# Patient Record
Sex: FEMALE | Race: WHITE | NOT HISPANIC OR LATINO
[De-identification: names, ages, dates, MRNs, and addresses within clinical notes are randomized per-mention and may not be internally consistent; named-entity substitution may affect disease eponyms.]

---

## 2017-02-24 ENCOUNTER — RECORDS - HEALTHEAST (OUTPATIENT)
Dept: ADMINISTRATIVE | Facility: OTHER | Age: 72
End: 2017-02-24

## 2017-02-24 ENCOUNTER — OFFICE VISIT - HEALTHEAST (OUTPATIENT)
Dept: FAMILY MEDICINE | Facility: CLINIC | Age: 72
End: 2017-02-24

## 2017-02-24 DIAGNOSIS — F33.1 MODERATE EPISODE OF RECURRENT MAJOR DEPRESSIVE DISORDER (H): ICD-10-CM

## 2017-02-24 DIAGNOSIS — R41.3 MEMORY LOSS: ICD-10-CM

## 2017-02-24 DIAGNOSIS — R32 URINARY INCONTINENCE: ICD-10-CM

## 2017-02-24 DIAGNOSIS — K59.00 CONSTIPATION: ICD-10-CM

## 2017-02-24 DIAGNOSIS — R32 URINARY INCONTINENCE, UNSPECIFIED TYPE: ICD-10-CM

## 2017-02-24 DIAGNOSIS — F03.90 DEMENTIA WITHOUT BEHAVIORAL DISTURBANCE, UNSPECIFIED DEMENTIA TYPE: ICD-10-CM

## 2017-02-24 DIAGNOSIS — Z00.01 ENCOUNTER FOR GENERAL ADULT MEDICAL EXAMINATION WITH ABNORMAL FINDINGS: ICD-10-CM

## 2017-02-24 DIAGNOSIS — N39.0 RECURRENT UTI: ICD-10-CM

## 2017-02-24 DIAGNOSIS — E03.9 HYPOTHYROIDISM, UNSPECIFIED TYPE: ICD-10-CM

## 2017-02-24 ASSESSMENT — MIFFLIN-ST. JEOR: SCORE: 857.63

## 2017-02-25 ENCOUNTER — COMMUNICATION - HEALTHEAST (OUTPATIENT)
Dept: FAMILY MEDICINE | Facility: CLINIC | Age: 72
End: 2017-02-25

## 2017-06-03 ENCOUNTER — COMMUNICATION - HEALTHEAST (OUTPATIENT)
Dept: SCHEDULING | Facility: CLINIC | Age: 72
End: 2017-06-03

## 2017-06-05 ENCOUNTER — COMMUNICATION - HEALTHEAST (OUTPATIENT)
Dept: FAMILY MEDICINE | Facility: CLINIC | Age: 72
End: 2017-06-05

## 2017-06-13 ENCOUNTER — COMMUNICATION - HEALTHEAST (OUTPATIENT)
Dept: FAMILY MEDICINE | Facility: CLINIC | Age: 72
End: 2017-06-13

## 2017-06-13 DIAGNOSIS — N39.0 UTI (URINARY TRACT INFECTION): ICD-10-CM

## 2017-07-19 ENCOUNTER — COMMUNICATION - HEALTHEAST (OUTPATIENT)
Dept: FAMILY MEDICINE | Facility: CLINIC | Age: 72
End: 2017-07-19

## 2017-07-19 DIAGNOSIS — K59.00 CONSTIPATION: ICD-10-CM

## 2017-07-20 ENCOUNTER — OFFICE VISIT - HEALTHEAST (OUTPATIENT)
Dept: FAMILY MEDICINE | Facility: CLINIC | Age: 72
End: 2017-07-20

## 2017-07-20 ASSESSMENT — MIFFLIN-ST. JEOR: SCORE: 847.19

## 2017-07-27 ENCOUNTER — RECORDS - HEALTHEAST (OUTPATIENT)
Dept: ADMINISTRATIVE | Facility: OTHER | Age: 72
End: 2017-07-27

## 2017-09-15 ENCOUNTER — RECORDS - HEALTHEAST (OUTPATIENT)
Dept: ADMINISTRATIVE | Facility: OTHER | Age: 72
End: 2017-09-15

## 2017-09-20 ENCOUNTER — RECORDS - HEALTHEAST (OUTPATIENT)
Dept: ADMINISTRATIVE | Facility: OTHER | Age: 72
End: 2017-09-20

## 2017-10-18 ENCOUNTER — COMMUNICATION - HEALTHEAST (OUTPATIENT)
Dept: FAMILY MEDICINE | Facility: CLINIC | Age: 72
End: 2017-10-18

## 2017-10-31 ENCOUNTER — COMMUNICATION - HEALTHEAST (OUTPATIENT)
Dept: FAMILY MEDICINE | Facility: CLINIC | Age: 72
End: 2017-10-31

## 2017-11-10 ENCOUNTER — COMMUNICATION - HEALTHEAST (OUTPATIENT)
Dept: FAMILY MEDICINE | Facility: CLINIC | Age: 72
End: 2017-11-10

## 2018-01-15 ENCOUNTER — COMMUNICATION - HEALTHEAST (OUTPATIENT)
Dept: FAMILY MEDICINE | Facility: CLINIC | Age: 73
End: 2018-01-15

## 2018-02-07 ENCOUNTER — COMMUNICATION - HEALTHEAST (OUTPATIENT)
Dept: SCHEDULING | Facility: CLINIC | Age: 73
End: 2018-02-07

## 2018-02-08 ENCOUNTER — RECORDS - HEALTHEAST (OUTPATIENT)
Dept: ADMINISTRATIVE | Facility: OTHER | Age: 73
End: 2018-02-08

## 2018-02-24 ENCOUNTER — COMMUNICATION - HEALTHEAST (OUTPATIENT)
Dept: FAMILY MEDICINE | Facility: CLINIC | Age: 73
End: 2018-02-24

## 2018-04-20 ENCOUNTER — COMMUNICATION - HEALTHEAST (OUTPATIENT)
Dept: FAMILY MEDICINE | Facility: CLINIC | Age: 73
End: 2018-04-20

## 2018-04-20 DIAGNOSIS — I63.9 CEREBRAL INFARCTION (H): ICD-10-CM

## 2018-04-20 DIAGNOSIS — I10 HTN (HYPERTENSION): ICD-10-CM

## 2018-04-20 DIAGNOSIS — Z29.89 NEED FOR PROPHYLAXIS AGAINST URINARY TRACT INFECTION: ICD-10-CM

## 2018-04-20 DIAGNOSIS — F33.1 MODERATE EPISODE OF RECURRENT MAJOR DEPRESSIVE DISORDER (H): ICD-10-CM

## 2018-04-21 RX ORDER — SERTRALINE HYDROCHLORIDE 100 MG/1
100 TABLET, FILM COATED ORAL DAILY
Qty: 28 TABLET | Refills: 10 | Status: SHIPPED | OUTPATIENT
Start: 2018-04-21

## 2018-04-21 RX ORDER — CLOPIDOGREL BISULFATE 75 MG/1
75 TABLET ORAL DAILY
Qty: 28 TABLET | Refills: 10 | Status: SHIPPED | OUTPATIENT
Start: 2018-04-21

## 2018-04-21 RX ORDER — TRIMETHOPRIM 100 MG/1
100 TABLET ORAL DAILY
Qty: 28 TABLET | Refills: 10 | Status: SHIPPED | OUTPATIENT
Start: 2018-04-21

## 2018-05-03 ENCOUNTER — COMMUNICATION - HEALTHEAST (OUTPATIENT)
Dept: FAMILY MEDICINE | Facility: CLINIC | Age: 73
End: 2018-05-03

## 2018-05-03 DIAGNOSIS — E03.9 HYPOTHYROIDISM: ICD-10-CM

## 2018-05-03 RX ORDER — LEVOTHYROXINE SODIUM 88 UG/1
TABLET ORAL
Qty: 28 TABLET | Refills: 11 | Status: SHIPPED | OUTPATIENT
Start: 2018-05-03

## 2018-05-18 ENCOUNTER — COMMUNICATION - HEALTHEAST (OUTPATIENT)
Dept: FAMILY MEDICINE | Facility: CLINIC | Age: 73
End: 2018-05-18

## 2018-05-18 DIAGNOSIS — R39.89 SENSATION OF PRESSURE IN BLADDER AREA: ICD-10-CM

## 2018-05-19 ENCOUNTER — RECORDS - HEALTHEAST (OUTPATIENT)
Dept: LAB | Facility: CLINIC | Age: 73
End: 2018-05-19

## 2018-05-19 LAB
ALBUMIN UR-MCNC: ABNORMAL MG/DL
APPEARANCE UR: ABNORMAL
BACTERIA #/AREA URNS HPF: ABNORMAL HPF
BILIRUB UR QL STRIP: NEGATIVE
COLOR UR AUTO: YELLOW
GLUCOSE UR STRIP-MCNC: NEGATIVE MG/DL
HGB UR QL STRIP: NEGATIVE
KETONES UR STRIP-MCNC: NEGATIVE MG/DL
LEUKOCYTE ESTERASE UR QL STRIP: ABNORMAL
MUCOUS THREADS #/AREA URNS LPF: ABNORMAL LPF
NITRATE UR QL: NEGATIVE
PH UR STRIP: 6 [PH] (ref 4.5–8)
RBC #/AREA URNS AUTO: ABNORMAL HPF
SP GR UR STRIP: 1.02 (ref 1–1.03)
SQUAMOUS #/AREA URNS AUTO: ABNORMAL LPF
UROBILINOGEN UR STRIP-ACNC: ABNORMAL
WBC #/AREA URNS AUTO: >100 HPF
WBC CLUMPS #/AREA URNS HPF: PRESENT /[HPF]

## 2018-05-20 LAB — BACTERIA SPEC CULT: NORMAL

## 2018-05-21 ENCOUNTER — AMBULATORY - HEALTHEAST (OUTPATIENT)
Dept: FAMILY MEDICINE | Facility: CLINIC | Age: 73
End: 2018-05-21

## 2018-05-21 ENCOUNTER — COMMUNICATION - HEALTHEAST (OUTPATIENT)
Dept: FAMILY MEDICINE | Facility: CLINIC | Age: 73
End: 2018-05-21

## 2018-05-21 DIAGNOSIS — N39.0 URINARY TRACT INFECTION: ICD-10-CM

## 2018-07-06 ENCOUNTER — OFFICE VISIT - HEALTHEAST (OUTPATIENT)
Dept: FAMILY MEDICINE | Facility: CLINIC | Age: 73
End: 2018-07-06

## 2018-07-06 DIAGNOSIS — F33.1 MODERATE EPISODE OF RECURRENT MAJOR DEPRESSIVE DISORDER (H): ICD-10-CM

## 2018-07-06 DIAGNOSIS — E03.9 HYPOTHYROIDISM, UNSPECIFIED TYPE: ICD-10-CM

## 2018-07-06 DIAGNOSIS — F03.90 DEMENTIA WITHOUT BEHAVIORAL DISTURBANCE, UNSPECIFIED DEMENTIA TYPE: ICD-10-CM

## 2018-07-06 DIAGNOSIS — I10 BENIGN ESSENTIAL HYPERTENSION: ICD-10-CM

## 2018-07-06 DIAGNOSIS — Z00.01 ENCOUNTER FOR GENERAL ADULT MEDICAL EXAMINATION WITH ABNORMAL FINDINGS: ICD-10-CM

## 2018-07-06 DIAGNOSIS — N39.0 RECURRENT UTI: ICD-10-CM

## 2018-07-06 DIAGNOSIS — K59.00 CONSTIPATION: ICD-10-CM

## 2018-07-06 DIAGNOSIS — Z12.31 VISIT FOR SCREENING MAMMOGRAM: ICD-10-CM

## 2018-07-06 LAB
ANION GAP SERPL CALCULATED.3IONS-SCNC: 10 MMOL/L (ref 5–18)
BUN SERPL-MCNC: 19 MG/DL (ref 8–28)
CALCIUM SERPL-MCNC: 9.8 MG/DL (ref 8.5–10.5)
CHLORIDE BLD-SCNC: 104 MMOL/L (ref 98–107)
CO2 SERPL-SCNC: 26 MMOL/L (ref 22–31)
CREAT SERPL-MCNC: 0.74 MG/DL (ref 0.6–1.1)
ERYTHROCYTE [DISTWIDTH] IN BLOOD BY AUTOMATED COUNT: 13.2 % (ref 11–14.5)
GFR SERPL CREATININE-BSD FRML MDRD: >60 ML/MIN/1.73M2
GLUCOSE BLD-MCNC: 106 MG/DL (ref 70–125)
HCT VFR BLD AUTO: 37.8 % (ref 35–47)
HGB BLD-MCNC: 12.5 G/DL (ref 12–16)
MCH RBC QN AUTO: 30.2 PG (ref 27–34)
MCHC RBC AUTO-ENTMCNC: 33.1 G/DL (ref 32–36)
MCV RBC AUTO: 91 FL (ref 80–100)
PLATELET # BLD AUTO: 314 THOU/UL (ref 140–440)
PMV BLD AUTO: 7.7 FL (ref 7–10)
POTASSIUM BLD-SCNC: 4.7 MMOL/L (ref 3.5–5)
RBC # BLD AUTO: 4.15 MILL/UL (ref 3.8–5.4)
SODIUM SERPL-SCNC: 140 MMOL/L (ref 136–145)
TSH SERPL DL<=0.005 MIU/L-ACNC: 6.67 UIU/ML (ref 0.3–5)
WBC: 8 THOU/UL (ref 4–11)

## 2018-07-06 RX ORDER — MIRABEGRON 25 MG/1
25 TABLET, FILM COATED, EXTENDED RELEASE ORAL
Status: SHIPPED | COMMUNITY
Start: 2018-07-06

## 2018-07-06 RX ORDER — ALBUTEROL SULFATE 5 MG/ML
SOLUTION RESPIRATORY (INHALATION)
Status: SHIPPED | COMMUNITY
Start: 2018-07-06

## 2018-07-09 ENCOUNTER — COMMUNICATION - HEALTHEAST (OUTPATIENT)
Dept: FAMILY MEDICINE | Facility: CLINIC | Age: 73
End: 2018-07-09

## 2018-07-11 ENCOUNTER — COMMUNICATION - HEALTHEAST (OUTPATIENT)
Dept: PHARMACY | Facility: CLINIC | Age: 73
End: 2018-07-11

## 2018-07-30 ENCOUNTER — COMMUNICATION - HEALTHEAST (OUTPATIENT)
Dept: FAMILY MEDICINE | Facility: CLINIC | Age: 73
End: 2018-07-30

## 2018-09-18 ENCOUNTER — RECORDS - HEALTHEAST (OUTPATIENT)
Dept: ADMINISTRATIVE | Facility: OTHER | Age: 73
End: 2018-09-18

## 2018-09-21 ENCOUNTER — COMMUNICATION - HEALTHEAST (OUTPATIENT)
Dept: FAMILY MEDICINE | Facility: CLINIC | Age: 73
End: 2018-09-21

## 2018-11-06 ENCOUNTER — COMMUNICATION - HEALTHEAST (OUTPATIENT)
Dept: FAMILY MEDICINE | Facility: CLINIC | Age: 73
End: 2018-11-06

## 2018-11-14 ENCOUNTER — RECORDS - HEALTHEAST (OUTPATIENT)
Dept: ADMINISTRATIVE | Facility: OTHER | Age: 73
End: 2018-11-14

## 2018-11-27 ENCOUNTER — COMMUNICATION - HEALTHEAST (OUTPATIENT)
Dept: FAMILY MEDICINE | Facility: CLINIC | Age: 73
End: 2018-11-27

## 2018-12-03 ENCOUNTER — COMMUNICATION - HEALTHEAST (OUTPATIENT)
Dept: FAMILY MEDICINE | Facility: CLINIC | Age: 73
End: 2018-12-03

## 2021-05-24 ENCOUNTER — RECORDS - HEALTHEAST (OUTPATIENT)
Dept: ADMINISTRATIVE | Facility: CLINIC | Age: 76
End: 2021-05-24

## 2021-05-25 ENCOUNTER — RECORDS - HEALTHEAST (OUTPATIENT)
Dept: ADMINISTRATIVE | Facility: CLINIC | Age: 76
End: 2021-05-25

## 2021-05-26 ENCOUNTER — RECORDS - HEALTHEAST (OUTPATIENT)
Dept: ADMINISTRATIVE | Facility: CLINIC | Age: 76
End: 2021-05-26

## 2021-05-27 ENCOUNTER — RECORDS - HEALTHEAST (OUTPATIENT)
Dept: ADMINISTRATIVE | Facility: CLINIC | Age: 76
End: 2021-05-27

## 2021-05-28 ENCOUNTER — RECORDS - HEALTHEAST (OUTPATIENT)
Dept: ADMINISTRATIVE | Facility: CLINIC | Age: 76
End: 2021-05-28

## 2021-05-29 ENCOUNTER — RECORDS - HEALTHEAST (OUTPATIENT)
Dept: ADMINISTRATIVE | Facility: CLINIC | Age: 76
End: 2021-05-29

## 2021-05-30 ENCOUNTER — RECORDS - HEALTHEAST (OUTPATIENT)
Dept: ADMINISTRATIVE | Facility: CLINIC | Age: 76
End: 2021-05-30

## 2021-05-30 VITALS — WEIGHT: 95.5 LBS | BODY MASS INDEX: 18.03 KG/M2 | HEIGHT: 61 IN

## 2021-05-31 VITALS — WEIGHT: 93.2 LBS | HEIGHT: 61 IN | BODY MASS INDEX: 17.59 KG/M2

## 2021-06-01 ENCOUNTER — RECORDS - HEALTHEAST (OUTPATIENT)
Dept: ADMINISTRATIVE | Facility: CLINIC | Age: 76
End: 2021-06-01

## 2021-06-01 VITALS — WEIGHT: 102 LBS | BODY MASS INDEX: 19.59 KG/M2

## 2021-06-09 NOTE — PROGRESS NOTES
Assessment and Plan:       1. Encounter for general adult medical examination with abnormal findings  Annual wellness visit completed.  Risks identified regarding cognitive impairment.  Hearing.  Needing assistance with activities of daily living, need for more exercise etc.  Patient with cognitive impairment currently residing at [facility].    2. Hypothyroidism, unspecified type  Repeat TSH to ensure adequate supplementation on levothyroxine 88  g daily.  - Thyroid Stimulating Hormone (TSH)    3. Recurrent UTI  We'll attempt to obtain urine for UA conditional UC rule out UTI with urinary incontinence concerns.    4. Moderate episode of recurrent major depressive disorder  Patient likes to increase sertraline from 50 mg up to 100 mg daily due to suboptimal depression management question seasonal affective component.  - sertraline (ZOLOFT) 100 MG tablet; Take 1 tablet (100 mg total) by mouth daily.  Dispense: 90 tablet; Refill: 3    5. Dementia without behavioral disturbance, unspecified dementia type  Update labs as noted.  Progressive dementia.  Did not tolerate donepezil.  Declines further medication.  Did not see a neurologist and declines referral.  Anticipate memory care placement in future.  - HM2(CBC w/o Differential)  - Comprehensive Metabolic Panel  - Vitamin B12    6. Urinary incontinence  UA UC to be obtained otherwise will complete through [facility].  Avoid overactive bladder medication due to anticholinergic side effects including cognitive impairment, dry mouth, etc.  - Urinalysis-UC if Indicated; Future    7. Constipation  Miralax 17 g daily.  Continue Senna.  Consider switch to Pericolace 8.6/50 1-2 capsules twice daily as needed.  Magnesium citrate 150 ml po x 1 for acute constipation.    The patient's current medical problems were reviewed.    I have had an Advance Directives discussion with the patient.  The following health maintenance  "schedule was reviewed with the patient and provided in printed form in the after visit summary:   Health Maintenance   Topic Date Due     COLONOSCOPY  08/19/1963     ADVANCE DIRECTIVES DISCUSSED WITH PATIENT  08/19/1963     DXA SCAN  08/19/2010     FALL RISK ASSESSMENT  08/19/2010     MAMMOGRAM  04/03/2015     INFLUENZA VACCINE RULE BASED (1) 08/01/2016     Depression Follow Up HE  01/29/2017     TD 18+ HE  04/13/2021     PNEUMOCOCCAL POLYSACCHARIDE VACCINE AGE 65 AND OVER  Completed     PNEUMOCOCCAL CONJUGATE VACCINE FOR ADULTS (PCV13 OR PREVNAR)  Completed     ZOSTER VACCINE  Completed        Subjective:   Chief Complaint: Tina David is an 71 y.o. female here for an Annual Wellness visit.     HPI:  Seen today for annual well most visit.  Resides at [facility].  Cognitive impairment with progressive memory loss.  Tried donepezil however did not tolerate a discontinued.  Daughter did not take her to neurologist.  They declined further intervention at this time.  Does have levothyroxine 88  g daily for thyroid replacement.  Sertraline 50 mg daily for depression with worsening noted.  Using trimethoprim 100 mg daily for UTI prophylaxis.  She needs verapamil  mg daily for hypertension.  Plavix 75 mg daily with history of CVA.  Senna 8.6 mg daily for constipation management.  Continues vitamin D thousand units daily.    Crystal - cell [phone]    since 2003   Daughter - Crystal (son in-law \"Tyshawn\"ert)   Son - Norm   Son - Vasquez   Son - Desmond   Future daughter-in-law - Mariya   Now resides at [facility] (apartment, assisted living)   Smoke 2 ppd - currently < 1 1/2 ppd -> now very little during winter...   EtOH - none   Surgeries: hysterectomy secondary to cystocele incontinence 1970s; bladder repair x 3; thyroidectomy 1966 due to thyroid CA; tubal ligation   Hospitalizations: 12/13-12/17/09 (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 paroxysmal a. fib (needs event monitor); " likely left ICA stent scheduled for 1/22/2010 after consultation 1/14/2010 (will hold warfarin 3 days prior for goal INR < 1.5, otherwise continue Plavix)   PHQ-9 score= 0/27 (9/16/09) sj     Review of Systems:  Please see above.  The rest of the review of systems are negative for all systems.    Patient Care Team:  Vargas Deleon MD as PCP - General (Family Medicine)     Patient Active Problem List   Diagnosis     Anxiety     Skin Neoplasm Of Uncertain Behavior     Nausea     Major Depression, Recurrent     Chronic Obstructive Pulmonary Disease     Difficulty Breathing (Dyspnea)     Cough     Hypothyroidism     Vitamin D Deficiency     Hyperlipidemia     Hypertension     Malaise     Memory Lapses Or Loss     Esophageal Reflux     Gastritis     Osteoporosis     Stroke Syndrome     Insomnia     Urinary Tract Infection     Pain During Urination (Dysuria)     Cataract     Seborrheic Keratosis     Altered mental status     Acute confusion     Dementia without behavioral disturbance, unspecified dementia type     Moderate episode of recurrent major depressive disorder     Recurrent UTI     Past Medical History:   Diagnosis Date     COPD (chronic obstructive pulmonary disease)      Disease of thyroid gland      Hypertension      Short-term memory loss      Stroke       Past Surgical History:   Procedure Laterality Date     HYSTERECTOMY        No family history on file.   Social History     Social History     Marital status:      Spouse name: N/A     Number of children: N/A     Years of education: N/A     Occupational History     Not on file.     Social History Main Topics     Smoking status: Current Every Day Smoker     Packs/day: 0.25     Smokeless tobacco: Never Used     Alcohol use No     Drug use: No     Sexual activity: Not on file     Other Topics Concern     Not on file     Social History Narrative    Has been living in senior assisted living community for the past 6 years       Current Outpatient  "Prescriptions   Medication Sig Dispense Refill     cholecalciferol, vitamin D3, 1,000 unit tablet Take 1,000 Units by mouth daily.       clopidogrel (PLAVIX) 75 mg tablet Take 1 tablet (75 mg total) by mouth daily. 90 tablet 3     levothyroxine (SYNTHROID, LEVOTHROID) 88 MCG tablet Take 1 tablet (88 mcg total) by mouth Daily at 6:00 am. 30 tablet 5     senna (SENOKOT) 8.6 mg tablet Take 1 tablet by mouth daily.       sertraline (ZOLOFT) 100 MG tablet Take 1 tablet (100 mg total) by mouth daily. 90 tablet 3     trimethoprim (TRIMPEX) 100 mg tablet Take 1 tablet (100 mg total) by mouth bedtime. (Patient taking differently: Take 100 mg by mouth bedtime. ) 90 tablet 3     verapamil (CALAN-SR) 240 MG CR tablet Take 240 mg by mouth daily.       No current facility-administered medications for this visit.       Objective:   Vital Signs:   Visit Vitals     /72     Pulse 62     Ht 5' 0.5\" (1.537 m)     Wt (!) 95 lb 8 oz (43.3 kg)     SpO2 96%     Breastfeeding No     BMI 18.34 kg/m2        VisionScreening:  No exam data present     PHYSICAL EXAM    General Appearance:    Alert, cooperative, no distress, appears older than stated age.  Cognitive impairment noted.   Head:    Normocephalic, without obvious abnormality, atraumatic   Eyes:    PERRL, conjunctiva/corneas clear, EOM's intact, fundi     benign, both eyes.  Glasses.        Ears:    Normal TM's and external ear canals, both ears   Nose:   Nares normal, septum midline, mucosa normal, no drainage    or sinus tenderness   Throat:   Lips, mucosa, and tongue normal; upper and lower dentures in place.     Neck:   Supple, symmetrical, trachea midline, no adenopathy;        thyroid:  No enlargement/tenderness/nodules; no carotid    bruit or JVD   Back:     Symmetric, no curvature, ROM normal, no CVA tenderness   Lungs:     Clear to auscultation bilaterally, respirations unlabored   Chest wall:    No tenderness or deformity.  Breast exam deferred.   Heart:    Regular rate " and rhythm, S1 and S2 normal, no murmur, rub   or gallop   Abdomen:     Soft, non-tender, bowel sounds active all four quadrants,     no masses, no organomegaly.     Genitalia:    deferred   Rectal:    Normal tone.  Prostate normal/symmetric, no masses or tenderness.   Extremities:   Extremities normal, atraumatic, no cyanosis or edema   Pulses:   2+ and symmetric all extremities   Skin:   Skin color, texture, turgor normal, no rashes or lesions   Lymph nodes:   Cervical, supraclavicular, and axillary nodes normal   Neurologic:   CNII-XII intact. Normal strength, sensation and reflexes       throughout.  Able to transfer without assistance.  Able to ambulate and turn in place without evidence of significant ataxia.          Assessment Results 2/24/2017   Activities of Daily Living No help needed   Instrumental Activities of Daily Living 5-6 - Severe functional impairment   Mini Cog Total Score 1     A Mini-Cog score of 0-2 suggests the possibility of dementia, score of 3-5 suggests no dementia    Identified Health Risks:     The patient was provided with suggestions to help her develop a healthy lifestyle.   She is at risk for lack of exercise and has been provided with information to increase physical activity for the benefit of her well-being.  The patient reports that she has difficulty with instrumental activities of daily living.  She was provided with a list of local organizations that provide support services and advised to make a follow up appointment in 6 months to address this further.  She continues to reside at [facility] with known cognitive impairement.  The patient s PHQ-9 score is consistent with moderate depression.  She was provided with information regarding depression and was advised to schedule a follow up appointment in 6 months to further address this issue.  She is at risk for falling and has been provided with information to reduce the risk of falling at home.  Patient's  advanced directive was discussed and I am comfortable with the patient's wishes.        The patient was provided with appropriate referrals to address her memory problem.

## 2021-06-12 NOTE — PROGRESS NOTES
Assessment/Plan:     1. Cystocele  Discussed possibly recheck of urine today but they declined is interested in referral to urology for further workup and treatment.  Each of them pictures on the computer of cystocele.  - Ambulatory referral to Urology        Subjective:      Tina David is a 71 y.o. female is in today with daughter.  Is my first time meeting with him briefly reviewed last visit note from primary care provider and last labs that were done for urinalysis.  She has frequent UTIs about 4 years so she is on chronic antibiotic therapy.  She is here today because she feels she has bladder falling out.  She states she has had it for some time but it seems to be getting worse she feels pressure in the area but no pain she feels that sometimes when she is standing she can feel pelvic organs falling down.  Has had no change in bowel but does admit sometimes she has to use her finger to pass bowel movement.  She has almost constant leakage of urine and has very little control.  Has had several children.  Does have some dementia so history is somewhat difficult but daughter fills in.    Current Outpatient Prescriptions   Medication Sig Dispense Refill     cholecalciferol, vitamin D3, 1,000 unit tablet Take 1,000 Units by mouth daily.       clopidogrel (PLAVIX) 75 mg tablet Take 1 tablet (75 mg total) by mouth daily. 90 tablet 3     levothyroxine (SYNTHROID, LEVOTHROID) 88 MCG tablet Take 1 tablet (88 mcg total) by mouth Daily at 6:00 am. 30 tablet 5     sertraline (ZOLOFT) 100 MG tablet Take 1 tablet (100 mg total) by mouth daily. 90 tablet 3     trimethoprim (TRIMPEX) 100 mg tablet Take 1 tablet (100 mg total) by mouth bedtime. (Patient taking differently: Take 100 mg by mouth bedtime. ) 90 tablet 3     verapamil (CALAN-SR) 240 MG CR tablet Take 240 mg by mouth daily.       SENNA 8.6 mg tablet TAKE 1 TABLET BY MOUTH TWICE DAILY DX CONSTIPATION 100 tablet 3     No current facility-administered  "medications for this visit.      Allergies   Allergen Reactions     Aspirin (Tartrazine Only) Nausea Only     Bupropion Hcl Nausea Only     Ondansetron Hcl Swelling     Prednisone Headache     Rofecoxib Nausea Only     Sulfa (Sulfonamide Antibiotics) Itching     glover       Past Medical History, Family History, and Social History reviewed.  Past Medical History:   Diagnosis Date     COPD (chronic obstructive pulmonary disease)      Disease of thyroid gland      Hypertension      Short-term memory loss      Stroke      Past Surgical History:   Procedure Laterality Date     HYSTERECTOMY       Aspirin (tartrazine only); Bupropion hcl; Ondansetron hcl; Prednisone; Rofecoxib; and Sulfa (sulfonamide antibiotics)  No family history on file.  Social History     Social History     Marital status:      Spouse name: N/A     Number of children: N/A     Years of education: N/A     Occupational History     Not on file.     Social History Main Topics     Smoking status: Current Every Day Smoker     Packs/day: 0.25     Smokeless tobacco: Never Used     Alcohol use No     Drug use: No     Sexual activity: Not on file     Other Topics Concern     Not on file     Social History Narrative    Has been living in senior assisted living community for the past 6 years          Review of systems is as stated in HPI, and the remainder of the 10 system review is otherwise negative.    Objective:     Vitals:    07/20/17 0802   BP: 116/50   Patient Site: Right Arm   Patient Position: Sitting   Cuff Size: Adult Regular   Pulse: 72   Weight: (!) 93 lb 3.2 oz (42.3 kg)   Height: 5' 0.5\" (1.537 m)    Body mass index is 17.9 kg/(m^2).  Wt Readings from Last 3 Encounters:   07/20/17 (!) 93 lb 3.2 oz (42.3 kg)   02/24/17 (!) 95 lb 8 oz (43.3 kg)   06/08/16 (!) 88 lb (39.9 kg)       General Appearance:    Alert, cooperative, no distress, appears stated age    Head:    Normocephalic, without obvious abnormality, atraumatic   Eyes:    PERRL, no " conjunctivitis    Ears:    Normal  external ear canals   Nose:   Mucosa normal, no drainage        Throat:   Oropharynx is clear   Neck:   Supple, symmetrical, no adenopathy, no thyromegally, no carotid bruit        Lungs:     Clear to auscultation bilaterally, respirations unlabored   Chest Wall:    No tenderness or deformity    Heart:    Regular rate and rhythm, S1 and S2 normal, no murmur, rub    or gallop       Abdomen:     Soft, non-tender, bowel sounds active all four quadrants,     no masses, no organomegaly significant CVA tenderness or suprapubic pain           Extremities:   Extremities normal, atraumatic, no cyanosis or edema   Pulses:   2+ and symmetric all extremities           Skin:   No rashes or lesions   Urinary:  Exam consistent with moderate cystocele possibly small rectocele.  No other significant abnormalities         This note has been dictated using voice recognition software. Any grammatical or context distortions are unintentional and inherent to the software.

## 2021-06-15 PROBLEM — N39.0 RECURRENT UTI: Status: ACTIVE | Noted: 2017-02-24

## 2021-06-15 PROBLEM — F33.1 MODERATE EPISODE OF RECURRENT MAJOR DEPRESSIVE DISORDER (H): Status: ACTIVE | Noted: 2017-02-24

## 2021-06-15 PROBLEM — F03.90 DEMENTIA WITHOUT BEHAVIORAL DISTURBANCE, UNSPECIFIED DEMENTIA TYPE: Status: ACTIVE | Noted: 2017-02-24

## 2021-06-16 PROBLEM — K59.00 CONSTIPATION: Status: ACTIVE | Noted: 2017-07-20

## 2021-06-19 NOTE — PROGRESS NOTES
Assessment and Plan:       1. Encounter for general adult medical examination with abnormal findings  Annual wellness visit completed.  Resistant to completion of colonoscopy and screening mammogram due to cognitive impairment etc.  I do feel that this is appropriate at this time to defer these screening tests due to progressive cognitive decline.  Risks associated with assistance needed with activities of daily living, cognitive decline, lack of exercise and prior falls.  Discussed use of walker to prevent falls while outdoors smoking.  Tobacco cessation recommended however patient declines currently with half pack per day smoking habit likely.    2. Visit for screening mammogram  Order was placed for screening mammogram however patient and daughter resistant to completion due to increased patient anxiety with procedure as well as cognitive impairment.  - Mammo Screening Bilateral; Future    3. Dementia without behavioral disturbance, unspecified dementia type  Progressive cognitive decline, stable.  Currently in senior living with some assistance with services including showering each week, picking out for each morning, going through evening routine as well as ensuring appropriate undergarments etc.  The check CBC and basic metabolic panel as well as TSH today.  Family declines neurology referral, further medication etc.  - HM2(CBC w/o Differential)  - Basic Metabolic Panel    4. Moderate episode of recurrent major depressive disorder (H)  Continue sertraline 100 mg daily which has been beneficial for mood as well as anxiety.    5. Hypothyroidism, unspecified type  Remains on levothyroxine 88 mcg daily.  Check TSH.  - Thyroid Stimulating Hormone (TSH)    6. Recurrent UTI  History recurrent UTI.  Cephalexin 500 mg daily recommended otherwise may remain off trimethoprim 100 mg daily as duplicate UTI prophylaxis treatment.  Most recent urine culture with mixed pathogens May 19, 2018.    7. Constipation  Discussed  constipation potentially associated with Myrbetriq for overactive bladder management.  Since no benefits will discontinue Myrbetriq.  Did not tolerate pessary.  Constipation discussion with MiraLAX 17 g daily and Leisa-Colace 8.6/50 using 1-2 capsules twice daily.  May use magnesium citrate half bottle once may repeat in 24 hours if no significant bowel movement.    8. Benign essential hypertension  Check basic metabolic panel for med monitoring while on verapamil 240 mg extended release daily.  - Basic Metabolic Panel    9.  History of left middle cerebral artery CVA with internal carotid artery stenosis  Continues Plavix 75 mg daily.        The patient's current medical problems were reviewed.    I have had an Advance Directives discussion with the patient.     The following health maintenance schedule was reviewed with the patient and provided in printed form in the after visit summary:   Health Maintenance   Topic Date Due     DEPRESSION FOLLOW UP  1945     DXA SCAN  08/19/2010     MAMMOGRAM  04/03/2015     FALL RISK ASSESSMENT  07/20/2018     COLONOSCOPY  12/06/2027 (Originally 8/19/1995)     INFLUENZA VACCINE RULE BASED (1) 08/01/2018     TD 18+ HE  04/13/2021     ADVANCE DIRECTIVES DISCUSSED WITH PATIENT  02/24/2022     PNEUMOCOCCAL POLYSACCHARIDE VACCINE AGE 65 AND OVER  Completed     PNEUMOCOCCAL CONJUGATE VACCINE FOR ADULTS (PCV13 OR PREVNAR)  Completed     ZOSTER VACCINE  Completed        Subjective:     Chief Complaint: Tina David is an 72 y.o. female here for an Annual Wellness visit.     HPI: Patient seen today for annual wellness visit.  In general doing okay.  Some constipation concerns.  History recurrent UTIs.  Appears to be using both trimethoprim 100 mg daily and cephalexin 500 mg daily for UTI prophylaxis.  Seen by [facility].  Started on Myrbetriq for possible OAB.  Uncertain if that has been of benefit however constipation seems to be worse.  Did not tolerate pessary.  Using  "sertraline 100 mg daily for anxiety and depression.  Continues to smoke perhaps half pack per day with COPD history.  Left middle cerebral artery CVA in the past with internal carotid artery stenosis.  Verapamil 240 mg extended release daily for hypertension management.  Levothyroxine 88 mcg daily for thyroid replacement.  No personal, family history of colon cancer.  Patient and daughter resistant to colonoscopy etc. due to progressive cognitive decline.  Living in senior living set up [facility] in [place] and does get assistance and services with showering, picking out out for, evening routine and etc.    Review of Systems:  Please see above.  The rest of the review of systems are negative for all systems.    Crystal - cell [phone]    since 2003   Daughter - Crystal (son in-law \"Tyshawn\"ert)   Son - Norm   Son - Vasquez   Son - Desmond   Future daughter-in-law - Mariya   Now resides at [facility] (apartment, assisted living)   Smoke 2 ppd - currently < 1 1/2 ppd -> now very little during winter...   EtOH - none   Surgeries: hysterectomy secondary to cystocele incontinence 1970s; bladder repair x 3; thyroidectomy 1966 due to thyroid CA; tubal ligation   Hospitalizations: 12/13-12/17/09 ([facility]) - left MCA CVA with right sided weakness; ? paroxysmal a. fib (needs event monitor); likely left ICA stent scheduled for 1/22/2010 after consultation 1/14/2010 (will hold warfarin 3 days prior for goal INR < 1.5, otherwise continue Plavix)   PHQ-9 score= 0/27 (9/16/09) sj     5/18/16 FYI: Ms David came in with some worsening confusion and some worsening weakness. Etiology is not totally clear but she might be just spiraling down because of the confusion, not going to the cafeteria to eat her meals, not getting enough fluid in-take, otherwise, small chance that the donepezil caused some of these problems. You can see my discharge summary for details. She actually is doing better, at home she couldn t get out of her " chair and she was easily able to get out of the bed today and walk to the bathroom and back, seems a little less confused. Definitely needs 24 hour supervision per PT, OT and my evaluation and is going to the TCU at [facility] and follow up with you in a week. Thanks for the opportunity to care for this pleasant woman.  left 5/16/16. Dr. Deejay Schafer      Patient Care Team:  Vargas Deleon MD as PCP - General (Family Medicine)     Patient Active Problem List   Diagnosis     Anxiety     Skin Neoplasm Of Uncertain Behavior     Nausea     Major Depression, Recurrent     Chronic Obstructive Pulmonary Disease     Difficulty Breathing (Dyspnea)     Cough     Hypothyroidism     Vitamin D Deficiency     Hyperlipidemia     Hypertension     Malaise     Memory Lapses Or Loss     Esophageal Reflux     Gastritis     Osteoporosis     Stroke Syndrome     Insomnia     Urinary Tract Infection     Pain During Urination (Dysuria)     Cataract     Seborrheic Keratosis     Altered mental status     Acute confusion     Dementia without behavioral disturbance, unspecified dementia type     Moderate episode of recurrent major depressive disorder (H)     Recurrent UTI     Constipation     Past Medical History:   Diagnosis Date     COPD (chronic obstructive pulmonary disease) (H)      Disease of thyroid gland      Hypertension      Short-term memory loss      Stroke (H)       Past Surgical History:   Procedure Laterality Date     HYSTERECTOMY        History reviewed. No pertinent family history.   Social History     Social History     Marital status:      Spouse name: N/A     Number of children: N/A     Years of education: N/A     Occupational History     Not on file.     Social History Main Topics     Smoking status: Current Every Day Smoker     Packs/day: 0.25     Smokeless tobacco: Never Used     Alcohol use No     Drug use: No     Sexual activity: Not on file     Other Topics Concern     Not on file     Social  History Narrative    Has been living in senior assisted living community for the past 6 years       Current Outpatient Prescriptions   Medication Sig Dispense Refill     albuterol (PROVENTIL) 5 mg/mL nebulizer solution Inhale.       CHOLECALCIFEROL 1,000 unit tablet TAKE 1 TABLET BY MOUTH EVERY DAY DX  SUPPLEMENT 100 tablet 11     clopidogrel (PLAVIX) 75 mg tablet Take 1 tablet (75 mg total) by mouth daily. 28 tablet 10     levothyroxine (SYNTHROID, LEVOTHROID) 88 MCG tablet TAKE 1 TABLET BY MOUTH EVERY DAY DX HYPOTHYROIDISM 28 tablet 11     mirabegron (MYRBETRIQ) 25 mg Tb24 ER tablet Take 25 mg by mouth.       SENNA 8.6 mg tablet TAKE 1 TABLET BY MOUTH TWICE DAILY DX CONSTIPATION 100 tablet 3     sertraline (ZOLOFT) 100 MG tablet Take 1 tablet (100 mg total) by mouth daily. 28 tablet 10     trimethoprim (TRIMPEX) 100 mg tablet Take 1 tablet (100 mg total) by mouth daily. 28 tablet 10     verapamil (CALAN-SR) 240 MG CR tablet Take 1 tablet (240 mg total) by mouth daily. 28 tablet 10     No current facility-administered medications for this visit.       Objective:   Vital Signs:   Visit Vitals     /80     Pulse 80     Wt 102 lb (46.3 kg)     SpO2 93%     Breastfeeding No     BMI 19.59 kg/m2        VisionScreening:  No exam data present     PHYSICAL EXAM  General Appearance: Alert, pleasant, appears stated age  Head: Normocephalic, without obvious abnormality  Eyes: PERRL, conjunctiva/corneas clear, EOM's intact  Ears: Normal TM's and external ear canals, both ears  Nose: Nares normal, septum midline,mucosa normal, no drainage  Throat: Lips, mucosa, and tongue normal; teeth and gums normal; oropharynx is clear  Neck: Supple,without lymphadenopathy or thyromegally  Lungs: Clear to auscultation bilaterally, respirations unlabored  Breasts: Nopalpable masses, tenderness, asymmetry, or nipple discharge. No axillary or supraclavicular lymphadenopathy  Heart: Regular rate and rhythm, no murmur   Abdomen: Soft,  non-tender, no masses, no organomegaly  Pelvic:  deferred  Extremities: Extremities with strong and symmetric pulses, no cyanosis or edema  Skin: Skin color, texture normal, no rashes or lesions  Neurologic: Normal      Assessment Results 7/6/2018   Activities of Daily Living 2-4 - Moderate impairment   Instrumental Activities of Daily Living 5-6 - Severe functional impairment   Get Up and Go Score 12 seconds or more   Mini Cog Total Score 0   Some recent data might be hidden     A Mini-Cog score of 0-2 suggests the possibility of dementia, score of 3-5 suggests no dementia    Identified Health Risks:     The patient was provided with suggestions to help her develop a healthy lifestyle.   She is at risk for lack of exercise and has been provided with information to increase physical activity for the benefit of her well-being.  The patient reports that she does not have all recommended working emergency equipment available. She was provided with information about emergency preparedness, including smoke detectors.  The patient reports that she has difficulty with activities of daily living. I have asked that the patient make a follow up appointment in 12 weeks where this issue will be further evaluated and addressed.  The patient reports that she has difficulty with instrumental activities of daily living.  She was provided with a list of local organizations that provide support services and advised to make a follow up appointment in 12 weeks to address this further.   The patient s PHQ-9 score is consistent with moderate depression.  She was provided with information regarding depression and was advised to schedule a follow up appointment in 12 weeks to further address this issue.  She is at risk for falling and has been provided with information to reduce the risk of falling at home.  Patient's advanced directive was discussed and I am comfortable with the patient's wishes.        The patient was provided with  appropriate referrals to address her memory problem.

## 2021-07-21 ENCOUNTER — RECORDS - HEALTHEAST (OUTPATIENT)
Dept: ADMINISTRATIVE | Facility: CLINIC | Age: 76
End: 2021-07-21